# Patient Record
Sex: MALE | Race: WHITE | NOT HISPANIC OR LATINO | Employment: FULL TIME | URBAN - METROPOLITAN AREA
[De-identification: names, ages, dates, MRNs, and addresses within clinical notes are randomized per-mention and may not be internally consistent; named-entity substitution may affect disease eponyms.]

---

## 2022-10-03 ENCOUNTER — OFFICE VISIT (OUTPATIENT)
Dept: OBGYN CLINIC | Facility: CLINIC | Age: 47
End: 2022-10-03
Payer: COMMERCIAL

## 2022-10-03 VITALS
SYSTOLIC BLOOD PRESSURE: 122 MMHG | HEIGHT: 73 IN | BODY MASS INDEX: 39.76 KG/M2 | WEIGHT: 300 LBS | DIASTOLIC BLOOD PRESSURE: 84 MMHG

## 2022-10-03 DIAGNOSIS — M54.12 RADICULITIS OF RIGHT CERVICAL REGION: Primary | ICD-10-CM

## 2022-10-03 PROCEDURE — 99203 OFFICE O/P NEW LOW 30 MIN: CPT | Performed by: PHYSICIAN ASSISTANT

## 2022-10-03 RX ORDER — MELOXICAM 15 MG/1
15 TABLET ORAL DAILY
Qty: 30 TABLET | Refills: 0 | Status: SHIPPED | OUTPATIENT
Start: 2022-10-03

## 2022-10-03 RX ORDER — PREDNISONE 10 MG/1
TABLET ORAL
Qty: 42 TABLET | Refills: 0 | Status: SHIPPED | OUTPATIENT
Start: 2022-10-03

## 2022-10-03 RX ORDER — ERGOCALCIFEROL 1.25 MG/1
CAPSULE ORAL
COMMUNITY
Start: 2022-08-20

## 2022-10-03 RX ORDER — LEVOTHYROXINE SODIUM 0.07 MG/1
75 TABLET ORAL DAILY
COMMUNITY
Start: 2022-09-13

## 2022-10-03 NOTE — PROGRESS NOTES
Patient Name:  Sigifredo Garnica  MRN:  [id number]    Assessment & Plan     Right shoulder and cervical spine pain  Likely cervical radiculitis  1  Prescription for prednisone taper  2  Prescription for meloxicam to be initiated after prednisone taper is completed  3  Referral to physical therapy  4  Work note provided  5  Follow-up in six weeks with primary care sports medicine  Chief Complaint     Right shoulder pain, cervical spine pain    History of the Present Illness     Sigifredo Garnica is a 49 y o  male who reports to the office today for evaluation of his right shoulder and cervical spine  He notes an onset of pain approximately one week ago  He denies any injury or trauma  Pain is localized to the right shoulder and right side of the cervical spine  He notes pain radiating distally along the right upper extremity to the elbow and forearm region  He denies any numbness and tingling in the arm  He does note subjective weakness  No instability  He notes associated spasm and tightness in the cervical spine  Pain is worse with increased activity as well as cervical spine range of motion  He denies any gait or balance issues  No bowel or bladder dysfunction  No saddle paresthesias  He has tried over-the-counter anti-inflammatories with limited improvement  Physical Exam     /84   Ht 6' 0 5" (1 842 m)   Wt 136 kg (300 lb)   BMI 40 13 kg/m²     Cervical spine:  No gross deformity  No tenderness to palpation midline  There is tenderness to palpation right paraspinal musculature and right trapezius  Cervical spine range of motion is intact and limited with extension and lateral rotation with discomfort noted  Motor/sensation intact C5-T1 bilaterally with 5/5 strength  Positive Spurling's  Negative Rand's test bilaterally  Right shoulder:  No gross deformity  No tenderness to palpation lateral shoulder, AC joint, and anterior shoulder   PROM is , ER-abd 90, IR-abd 50  Impingement signs are negative  Negative empty can test   Negative speed's test   Negative cross-body adduction test   5/5 forward flexion strength  Eyes: Anicteric sclerae  ENT: Trachea midline  Lungs: Normal respiratory effort  CV: Capillary refill is less than 2 seconds  Skin: Intact without erythema  Lymph: No palpable lymphadenopathy  Neuro: Sensation is grossly intact to light touch  Psych: Mood and affect are appropriate  Data Review     I have personally reviewed pertinent films in PACS, and my interpretation follows:    X-rays right shoulder 10/3/22:  No acute osseous abnormalities  No fracture or dislocation  No significant degenerative changes  X-rays cervical spine 10/3/22:  No acute osseous abnormalities  No fracture or evidence of instability  No significant degenerative changes  History reviewed  No pertinent past medical history  History reviewed  No pertinent surgical history  No Known Allergies    Current Outpatient Medications on File Prior to Visit   Medication Sig Dispense Refill    ergocalciferol (VITAMIN D2) 50,000 units 1 CAP ONCE A WEEK      levothyroxine 75 mcg tablet Take 75 mcg by mouth daily       No current facility-administered medications on file prior to visit  Social History     Tobacco Use    Smoking status: Never Smoker    Smokeless tobacco: Never Used   Vaping Use    Vaping Use: Never used   Substance Use Topics    Alcohol use: Never    Drug use: Never       History reviewed  No pertinent family history  Review of Systems     As stated in the HPI  All other systems reviewed and are negative

## 2022-10-03 NOTE — LETTER
October 3, 2022     Patient: Shabnam Chow  YOB: 1975  Date of Visit: 10/3/2022      To Whom it May Concern:    Shabnam Chow is under my professional care  Melany Haynes was seen in my office on 10/3/2022  He is out of work until 10/7/22  At that point he may return full duty without restrictions  Patient was transported by his wife to his appointment today  If you have any questions or concerns, please don't hesitate to call           Sincerely,          Caridad Winn PA-C

## 2022-10-10 ENCOUNTER — EVALUATION (OUTPATIENT)
Dept: PHYSICAL THERAPY | Facility: REHABILITATION | Age: 47
End: 2022-10-10
Payer: COMMERCIAL

## 2022-10-10 DIAGNOSIS — M54.12 RADICULITIS OF RIGHT CERVICAL REGION: Primary | ICD-10-CM

## 2022-10-10 PROCEDURE — 97140 MANUAL THERAPY 1/> REGIONS: CPT | Performed by: PHYSICAL THERAPIST

## 2022-10-10 PROCEDURE — 97110 THERAPEUTIC EXERCISES: CPT | Performed by: PHYSICAL THERAPIST

## 2022-10-10 PROCEDURE — 97161 PT EVAL LOW COMPLEX 20 MIN: CPT | Performed by: PHYSICAL THERAPIST

## 2022-10-10 NOTE — PROGRESS NOTES
PT Evaluation     Today's date: 10/10/2022  Patient name: Jamilah Kumar  : 1975  MRN: [id number]  Referring provider: Marva Richards*  Dx:   Encounter Diagnosis     ICD-10-CM    1  Radiculitis of right cervical region  M54 12 Ambulatory Referral to Physical Therapy                  Assessment/Plan     Jamilah Kumar is a 49 y o  male who was referred to physical therapy for management of R sided cervical/shoulder pain secondary to probable C7 radiculopathy  Primary impairments include asymmetrical cervical ROM, decreased R UE strength, and report of pain  Consequently, patient has difficulty completing ADLs including lifting and prolonged sitting  Cleophus Daring would benefit from skilled intervention to address all deficits and improve functional capability  Patient is a good candidate for therapy, pending compliance with HEP and 2x weekly participation  Thank you for the referral and please do not hesitate to contact me with any questions or concerns regarding Ian’s care! Plan  Frequency: 2x per week   Duration in visits: 12  Duration in weeks: 6  POC start date: 10/10/22  POC end date: 22   Therapeutic exercise/activity, neuromuscular reeducation, manual therapy, and modalities  Patient understands and agrees to plan of care  Goals  Short Term--4 weeks  1  2 point decrease in pain levels  2  1/2 point increase in elbow extension/wrist flexion  3  Symmetrical cervical ROM  4  Negative R Spurlings    Long Term--By Discharge  1  Patient will achieve expected FOTO score  2  Patient will lift without restriction at work  3  Patient will sit without limitation  Patient's Goal: Decrease pain  Subjective     History   Date of Onset: Approx 3 weeks ago Description: Insidious onset of shoulder discomfort that worsened over a week into severe, unrelenting neck/arm pain  Patient reports that his neck was stuck in flexed position and he was unable to move his arm   He consulted with ortho, where he was provided a steroid taper, which he his [facility]FP through, and then will transition to prescription NSAID  He reports significant improvement with this treatment  Symptoms  Intermittent "throbbing" shoulder pain that radiates to the elbow  Symptoms aggravated with sitting and lifting  Pain is worse in the morning  Pain at best: 0 (at rest)   Pain at worst: [address] lives with his wife in a multistory home  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 SB Left 100   SB Right 80 P! ROT Left 100   ROT Right 80 P! Protraction: Full ROM with no aggravation of pain  Retraction: Full ROM with inc shoulder pain    Shoulder AROM: Full ROM except for horizontal adduction (only able to reach mid clavicle)              MMT    Shoulder       R       L   Flex  5 5   Extn  5 5   Abd  5 5   IR  4+ 5   ER  4 5                MMT    Elbow       R       L   Flex  5 5   Extn  4 5              MMT    Wrist       R         L   Flex  4 5   Extn  5 5    bent WNL WNL       Head positioning: forward head, rounded shoulders  Sensation (pinprick L/R):   intact   ULTT’s:  intact     Vertebral artery test=  NEG sharp carmen test=   NEG supine transverse ligament=  NEG Alar ligament= NEG    Compression=  NEG  Spurling’s= POS R    Cervical joint mobility: Hypomobile and inc shoulder pain with R->L lateral glides lower cervical spine; full resolution of sx with L->R glides    Thoracic mobility: Inc hypomobility mid/upper thoracic         Precautions: n/a         Manuals 10/10            SOR w/ distraction JAB            L->R lower cervical glides for opening Gr 4 JAB            Prone thoracic mobs Gr 4-5 JAB            Prone CTJ gr 5 JAB            Supine inf AC/SCJ mobs JAB            Neuro Re-Ed             UBE retro jeffery [employer]ENN             bilat TB ER                                                    Ther Ex             UT stretch to L for opening 10x10"             Standing pec stretch 10x10"                                                                                          Ther Activity                                       Gait Training                                       Modalities             Traction?

## 2022-10-10 NOTE — PROGRESS NOTES
PT Evaluation     Today's date: 10/10/2022  Patient name: Quique Lock  : 1975  MRN: [id number]  Referring provider: Suyapa Vaughan*  Dx:   Encounter Diagnosis     ICD-10-CM    1  Radiculitis of right cervical region  M54 12 Ambulatory Referral to Physical Therapy     PT plan of care cert/re-cert       Start Time: 730  Stop Time: 0815  Total time in clinic (min): 45 minutes    Assessment/Plan     Quique Lock is a 49 y o  male who was referred to physical therapy for management of R sided cervical/shoulder pain secondary to probable C7 radiculopathy  Primary impairments include asymmetrical cervical ROM, decreased R UE strength, and report of pain  Consequently, patient has difficulty completing ADLs including lifting and prolonged sitting  Jose J Vincent would benefit from skilled intervention to address all deficits and improve functional capability  Patient is a good candidate for therapy, pending compliance with HEP and 2x weekly participation  Thank you for the referral and please do not hesitate to contact me with any questions or concerns regarding Ian’s care! Plan  Frequency: 2x per week   Duration in visits: 12  Duration in weeks: 6  POC start date: 10/10/22  POC end date: 22   Therapeutic exercise/activity, neuromuscular reeducation, manual therapy, and modalities  Patient understands and agrees to plan of care  Goals  Short Term--4 weeks  1  2 point decrease in pain levels  2  1/2 point increase in elbow extension/wrist flexion  3  Symmetrical cervical ROM  4  Negative R Spurlings    Long Term--By Discharge  1  Patient will achieve expected FOTO score  2  Patient will lift without restriction at work  3  Patient will sit without limitation  Patient's Goal: Decrease pain  Subjective     History   Date of Onset: Approx 3 weeks ago Description: Insidious onset of shoulder discomfort that worsened over a week into severe, unrelenting neck/arm pain  Patient reports that his neck was stuck in flexed position and he was unable to move his arm  He consulted with ortho, where he was provided a steroid taper, which he his USP through, and then will transition to prescription NSAID  He reports significant improvement with this treatment  Symptoms  Intermittent "throbbing" shoulder pain that radiates to the elbow  Symptoms aggravated with sitting and lifting  Pain is worse in the morning  Pain at best: 0 (at rest)   Pain at worst: [address] lives with his wife in a multistory home  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 SB Left 100   SB Right 80 P! ROT Left 100   ROT Right 80 P! Protraction: Full ROM with no aggravation of pain  Retraction: Full ROM with inc shoulder pain    Shoulder AROM: Full ROM except for horizontal adduction (only able to reach mid clavicle)              MMT    Shoulder       R       L   Flex  5 5   Extn  5 5   Abd  5 5   IR  4+ 5   ER  4 5                MMT    Elbow       R       L   Flex  5 5   Extn  4 5              MMT    Wrist       R         L   Flex  4 5   Extn  5 5    bent WNL WNL       Head positioning: forward head, rounded shoulders  Sensation (pinprick L/R):   intact   ULTT’s:  intact     Vertebral artery test=  NEG sharp carmen test=   NEG supine transverse ligament=  NEG Alar ligament= NEG    Compression=  NEG  Spurling’s= POS R    Cervical joint mobility: Hypomobile and inc shoulder pain with R->L lateral glides lower cervical spine; full resolution of sx with L->R glides    Thoracic mobility: Inc hypomobility mid/upper thoracic         Precautions: n/a         Manuals 10/10            SOR w/ distraction JAB            L->R lower cervical glides for opening Gr 4 JAB            Prone thoracic mobs Gr 4-5 JAB            Prone CTJ gr 5 JAB            Supine inf AC/SCJ mobs JAB            Neuro Re-Ed             UBE retro             jeffery rows             jeffery LPD             bilat TB ER                                                    Ther Ex             UT stretch to L for opening 10x10"             Standing pec stretch 10x10"                                                                                          Ther Activity                                       Gait Training                                       Modalities             Traction?

## 2022-10-14 ENCOUNTER — OFFICE VISIT (OUTPATIENT)
Dept: PHYSICAL THERAPY | Facility: REHABILITATION | Age: 47
End: 2022-10-14
Payer: COMMERCIAL

## 2022-10-14 DIAGNOSIS — M54.12 RADICULITIS OF RIGHT CERVICAL REGION: Primary | ICD-10-CM

## 2022-10-14 PROCEDURE — 97140 MANUAL THERAPY 1/> REGIONS: CPT

## 2022-10-14 PROCEDURE — 97110 THERAPEUTIC EXERCISES: CPT

## 2022-10-14 NOTE — PROGRESS NOTES
Daily Note     Today's date: 10/14/2022  Patient name: Luís Zapata  : 1975  MRN: [id number]  Referring provider: Jazmine Balderrama*  Dx:   Encounter Diagnosis     ICD-10-CM    1  Radiculitis of right cervical region  M54 12                    Subjective: pt reports decreased intensity and frequency in R forearm which he describes as achiness  He further noted relief following IE and compliance with HEP  Objective: See treatment diary below      Assessment: Tolerated treatment well and without complaints  Good response with addition of manuals with reports of relief upon completion  Minimal soft tissue restrictions in UT and LS  Reviewed home exercises with patient with no adverse reactions  Patient demonstrated fatigue post treatment, exhibited good technique with therapeutic exercises and would benefit from continued PT  Plan: Continue per plan of care  Progress treatment as tolerated  Precautions: n/a         Manuals 10/10 10/14           SOR w/ distraction JAB TE           UT/LS STM and stretching  TE           L->R lower cervical glides for opening Gr 4 JAB            Prone thoracic mobs Gr 4-5 JAB            Prone CTJ gr 5 JAB            Supine inf AC/SCJ mobs JAB            Neuro Re-Ed             UBE retro             jeffeyr rows             jeffery LPD             bilat TB ER                                                    Ther Ex             UT stretch to L for opening 10x10"  10"x10           Standing pec stretch 10x10" 10"x10                                                                                         Ther Activity                                       Gait Training                                       Modalities             MHP pre  x10 min

## 2022-10-17 ENCOUNTER — OFFICE VISIT (OUTPATIENT)
Dept: PHYSICAL THERAPY | Facility: REHABILITATION | Age: 47
End: 2022-10-17
Payer: COMMERCIAL

## 2022-10-17 DIAGNOSIS — M54.12 RADICULITIS OF RIGHT CERVICAL REGION: Primary | ICD-10-CM

## 2022-10-17 PROCEDURE — 97110 THERAPEUTIC EXERCISES: CPT

## 2022-10-17 PROCEDURE — 97140 MANUAL THERAPY 1/> REGIONS: CPT

## 2022-10-17 NOTE — PROGRESS NOTES
Daily Note     Today's date: 10/17/2022  Patient name: Frederick Lopez  : 1975  MRN: [id number]  Referring provider: Donavan Gomes*  Dx:   Encounter Diagnosis     ICD-10-CM    1  Radiculitis of right cervical region  M54 12                    Subjective: pt reports that he is feeling really good today as well as over the weekend  Notes 0/10 pain upon presentation stating that he feels as if he is back to normal        Objective: See treatment diary below      Assessment: Tolerated treatment well and without complaints  Continues to demonstrate good carry over with exercises, feeling looser after manauls  Patient demonstrated fatigue post treatment, exhibited good technique with therapeutic exercises and would benefit from continued PT  Plan: Continue per plan of care  Progress treatment as tolerated  Precautions: n/a         Manuals 10/10 10/14 10/17          SOR w/ distraction JAB TE MM          UT/LS STM and stretching  TE MM          L->R lower cervical glides for opening Gr 4 JAB            Prone thoracic mobs Gr 4-5 JAB            Prone CTJ gr 5 JAB            Supine inf AC/SCJ mobs JAB            Neuro Re-Ed             UBE retro             jeffery rows             jeffery LPD             bilat TB ER                                                    Ther Ex             UT stretch to L for opening 10x10"  10"x10 10"x 10          Standing pec stretch 10x10" 10"x10 10"x 10                                                                                        Ther Activity                                       Gait Training                                       Modalities             MHP pre  x10 min x10 min

## 2022-10-19 ENCOUNTER — APPOINTMENT (OUTPATIENT)
Dept: PHYSICAL THERAPY | Facility: REHABILITATION | Age: 47
End: 2022-10-19

## 2022-10-24 ENCOUNTER — OFFICE VISIT (OUTPATIENT)
Dept: PHYSICAL THERAPY | Facility: REHABILITATION | Age: 47
End: 2022-10-24
Payer: COMMERCIAL

## 2022-10-24 DIAGNOSIS — M54.12 RADICULITIS OF RIGHT CERVICAL REGION: Primary | ICD-10-CM

## 2022-10-24 PROCEDURE — 97110 THERAPEUTIC EXERCISES: CPT

## 2022-10-24 NOTE — PROGRESS NOTES
Daily Note     Today's date: 10/24/2022  Patient name: Marcelene Holstein  : 1975  MRN: [id number]  Referring provider: Alice Moe*  Dx:   Encounter Diagnosis     ICD-10-CM    1  Radiculitis of right cervical region  M54 12                   Subjective: pt reports he no longer has been experiencing R UE radic sx's and neck pain  He wishes to make today the last day in therapy and continue with exercises at home  Objective: See treatment diary below      Assessment: Tolerated treatment well and without complaints  Good tolerance with progressions with no adverse reactions  Reviewed home exercises and issued updated HEP  Patient demonstrated fatigue post treatment and exhibited good technique with therapeutic exercises  Plan: Pt currently discharged from PT to be independent in HEP  Precautions: n/a         Manuals 10/10 10/14 10/17 10/24         SOR w/ distraction JAB TE MM          UT/LS STM and stretching  TE MM          L->R lower cervical glides for opening Gr 4 JAB            Prone thoracic mobs Gr 4-5 JAB            Prone CTJ gr 5 JAB            Supine inf AC/SCJ mobs JAB            Neuro Re-Ed             UBE retro    3' ea         jeffery rows    btb 5"2x10         jeffery LPD    btb 5"2x10         bilat TB ER    btb 5" 2x10                                                Ther Ex             UT stretch to L for opening 10x10"  10"x10 10"x 10 30"x3         Standing pec stretch 10x10" 10"x10 10"x 10 30"x3                                                                                       Ther Activity                                       Gait Training                                       Modalities             MHP pre  x10 min x10 min D/C

## 2022-10-26 ENCOUNTER — APPOINTMENT (OUTPATIENT)
Dept: PHYSICAL THERAPY | Facility: REHABILITATION | Age: 47
End: 2022-10-26

## 2022-10-31 ENCOUNTER — APPOINTMENT (OUTPATIENT)
Dept: PHYSICAL THERAPY | Facility: REHABILITATION | Age: 47
End: 2022-10-31

## 2022-11-11 ENCOUNTER — OFFICE VISIT (OUTPATIENT)
Dept: OBGYN CLINIC | Facility: CLINIC | Age: 47
End: 2022-11-11

## 2022-11-11 VITALS
HEIGHT: 73 IN | WEIGHT: 301.2 LBS | BODY MASS INDEX: 39.92 KG/M2 | DIASTOLIC BLOOD PRESSURE: 76 MMHG | HEART RATE: 82 BPM | SYSTOLIC BLOOD PRESSURE: 125 MMHG

## 2022-11-11 DIAGNOSIS — R29.898 RIGHT ARM WEAKNESS: ICD-10-CM

## 2022-11-11 DIAGNOSIS — M54.12 RADICULITIS OF RIGHT CERVICAL REGION: Primary | ICD-10-CM

## 2022-11-11 NOTE — PROGRESS NOTES
Assessment/Plan:    Diagnoses and all orders for this visit:    Radiculitis of right cervical region    Right arm weakness    Patient greatly improved after Prednisone, however he is left with intermittent n/t right elbow area, and has mild weakness C6-7 right arm on exam   May hold off on Mobic at this time  He has transitioned to HEP  If weakness persists t/c MRI C spine    Return in about 4 weeks (around 12/9/2022)  Chief Complaint:     Chief Complaint   Patient presents with   • Right Shoulder - Pain   • Neck - Pain       Subjective:   Patient ID: Catalino Schumacher is a 49 y o  male  RHD Patient returns s/p Prednisone and PT taking Mobic  He notes significant improvement of symptoms, denies significant pain but he is experiencing occasional numbness right elbow  Initial note with Ortho clinic PA: Catalino Schumacher is a 49 y o  male who reports to the office today for evaluation of his right shoulder and cervical spine  He notes an onset of pain approximately one week ago  He denies any injury or trauma  Pain is localized to the right shoulder and right side of the cervical spine  He notes pain radiating distally along the right upper extremity to the elbow and forearm region  He denies any numbness and tingling in the arm  He does note subjective weakness  No instability  He notes associated spasm and tightness in the cervical spine  Pain is worse with increased activity as well as cervical spine range of motion  He denies any gait or balance issues  No bowel or bladder dysfunction  No saddle paresthesias  He has tried over-the-counter anti-inflammatories with limited improvement  Review of Systems    The following portions of the patient's chart were reviewed and updated as appropriate: Allergy:  No Known Allergies    History reviewed  No pertinent past medical history  History reviewed  No pertinent surgical history      Social History     Socioeconomic History   • Marital status: /Civil Union     Spouse name: Not on file   • Number of children: Not on file   • Years of education: Not on file   • Highest education level: Not on file   Occupational History   • Not on file   Tobacco Use   • Smoking status: Never Smoker   • Smokeless tobacco: Never Used   Vaping Use   • Vaping Use: Never used   Substance and Sexual Activity   • Alcohol use: Never   • Drug use: Never   • Sexual activity: Not on file   Other Topics Concern   • Not on file   Social History Narrative   • Not on file     Social Determinants of Health     Financial Resource Strain: Not on file   Food Insecurity: Not on file   Transportation Needs: Not on file   Physical Activity: Not on file   Stress: Not on file   Social Connections: Not on file   Intimate Partner Violence: Not on file   Housing Stability: Not on file       History reviewed  No pertinent family history  Medications:    Current Outpatient Medications:   •  ergocalciferol (VITAMIN D2) 50,000 units, 1 CAP ONCE A WEEK, Disp: , Rfl:   •  levothyroxine 75 mcg tablet, Take 75 mcg by mouth daily, Disp: , Rfl:   •  meloxicam (Mobic) 15 mg tablet, Take 1 tablet (15 mg total) by mouth daily, Disp: 30 tablet, Rfl: 0  •  predniSONE 10 mg tablet, 6 tabs days 1&2, 5 tabs days 3&4, 4 tabs days 5&6, 3 tabs days 7&8, 2 tabs days 9&10, 1 tab days 11&12 (Patient not taking: Reported on 11/11/2022), Disp: 42 tablet, Rfl: 0    There is no problem list on file for this patient  Objective:  /76   Pulse 82   Ht 6' 0 5" (1 842 m)   Wt (!) 137 kg (301 lb 3 2 oz)   BMI 40 29 kg/m²     Ortho Exam    Physical Exam      Neurologic Exam     Motor Exam     Strength   Right deltoid: 5/5  Left deltoid: 5/5  Right biceps: 5/5  Right triceps: 4/5  Left triceps: 5/5  Right wrist extension: 4/5  Left wrist extension: 5/5      Procedures    I have personally reviewed the written report of the pertinent studies   Xrays C spine and Right Shoulder

## 2024-08-08 ENCOUNTER — OFFICE VISIT (OUTPATIENT)
Dept: FAMILY MEDICINE CLINIC | Facility: CLINIC | Age: 49
End: 2024-08-08
Payer: COMMERCIAL

## 2024-08-08 VITALS
TEMPERATURE: 97.4 F | WEIGHT: 315 LBS | HEART RATE: 72 BPM | BODY MASS INDEX: 41.75 KG/M2 | SYSTOLIC BLOOD PRESSURE: 146 MMHG | DIASTOLIC BLOOD PRESSURE: 94 MMHG | HEIGHT: 73 IN | OXYGEN SATURATION: 94 % | RESPIRATION RATE: 16 BRPM

## 2024-08-08 DIAGNOSIS — E03.9 HYPOTHYROIDISM, UNSPECIFIED TYPE: Primary | ICD-10-CM

## 2024-08-08 DIAGNOSIS — E78.5 HYPERLIPIDEMIA, UNSPECIFIED HYPERLIPIDEMIA TYPE: ICD-10-CM

## 2024-08-08 DIAGNOSIS — R79.89 ELEVATED LFTS: ICD-10-CM

## 2024-08-08 DIAGNOSIS — Z12.5 PROSTATE CANCER SCREENING: ICD-10-CM

## 2024-08-08 DIAGNOSIS — M25.552 LEFT HIP PAIN: ICD-10-CM

## 2024-08-08 DIAGNOSIS — N52.9 ERECTILE DYSFUNCTION, UNSPECIFIED ERECTILE DYSFUNCTION TYPE: ICD-10-CM

## 2024-08-08 DIAGNOSIS — Z12.11 COLON CANCER SCREENING: ICD-10-CM

## 2024-08-08 DIAGNOSIS — R03.0 ELEVATED BLOOD PRESSURE READING IN OFFICE WITHOUT DIAGNOSIS OF HYPERTENSION: ICD-10-CM

## 2024-08-08 DIAGNOSIS — Z00.00 HEALTHCARE MAINTENANCE: ICD-10-CM

## 2024-08-08 PROCEDURE — 99204 OFFICE O/P NEW MOD 45 MIN: CPT | Performed by: NURSE PRACTITIONER

## 2024-08-08 PROCEDURE — 99396 PREV VISIT EST AGE 40-64: CPT | Performed by: NURSE PRACTITIONER

## 2024-08-08 RX ORDER — SILDENAFIL 100 MG/1
100 TABLET, FILM COATED ORAL DAILY PRN
Qty: 30 TABLET | Refills: 1 | Status: SHIPPED | OUTPATIENT
Start: 2024-08-08

## 2024-08-08 NOTE — ASSESSMENT & PLAN NOTE
- Recommend NSAIDs, rest, and ice during episodes of pain.   - Referred to Orthopedic Surgery for further evaluation.

## 2024-08-08 NOTE — ASSESSMENT & PLAN NOTE
- Reviewed immunizations and screenings.   - UTD with dental and vision exams.   - Cologuard ordered.

## 2024-08-08 NOTE — ASSESSMENT & PLAN NOTE
- Not well controlled as of last lipid panel.   - Encourage healthy diet and regular exercise.  - Will obtain updated fasting lipid panel.

## 2024-08-08 NOTE — PROGRESS NOTES
Ambulatory Visit  Name: Ian Sharma      : 1975      MRN: [id number]  Encounter Provider: KAMALA Palma  Encounter Date: 2024   Encounter department:  [facility] [address] [facility]    Assessment & Plan   1. Hypothyroidism, unspecified type  Assessment & Plan:  - Continue levothyroxine 75 mcg daily.   - Will obtain updated TSH and free T4.  2. Hyperlipidemia, unspecified hyperlipidemia type  Assessment & Plan:  - Not well controlled as of last lipid panel.   - Encourage healthy diet and regular exercise.  - Will obtain updated fasting lipid panel.   3. Elevated blood pressure reading in office without diagnosis of hypertension  Assessment & Plan:  - Advised to monitor blood pressure at home and record readings. Bring to next visit.  - Recommend follow up in 1 month.   4. Elevated LFTs  Assessment & Plan:  - History of elevated LFTs. Abdominal US in  significant for hepatic steatosis.   - Will repeat CMP.   - Consider referral to GI/Hepatology.   5. Left hip pain  Assessment & Plan:  - Recommend NSAIDs, rest, and ice during episodes of pain.   - Referred to Orthopedic Surgery for further evaluation.   Orders:  -     Ambulatory Referral to Orthopedic Surgery; Future  6. Erectile dysfunction, unspecified erectile dysfunction type  Assessment & Plan:  - Refill sent for sildenafil.   Orders:  -     sildenafil (VIAGRA) 100 mg tablet; Take 1 tablet (100 mg total) by mouth daily as needed for erectile dysfunction  7. Healthcare maintenance  Assessment & Plan:  - Reviewed immunizations and screenings.   - UTD with dental and vision exams.   - Cologuard ordered.   Orders:  -     CBC and differential; Future  -     Comprehensive metabolic panel; Future  -     Lipid Panel with Direct LDL reflex; Future  -     TSH, 3rd generation with Free T4 reflex; Future  8. Prostate cancer screening  -     PSA, Total Screen; Future  9. Colon cancer screening  -     Cologuard       History of Present  Illness     Patient presents to office today to establish care. He has PMH of hypothyroidism. Also had elevated LFTs in the past. Was sent for abdominal US which showed hepatic steatosis and 2 small liver cysts. He is due for updated blood work. He has complaints today of intermittent sharp, pinching left hip pain that has been occurring on and off for the past 3 years. When the pain happens it causes him difficulty walking. The pain goes away on its own with rest. He is usually fine the next day. Doesn't take any medication for the pain. Denies any injury or trauma to the area. Also concerned with some varicose veins on his legs. They are not painful. He denies any pain or pallor to his lower extremities. His blood pressure is also elevated during today's visit. No prior history of hypertension. Unaware of any family history of hypertension. He denies any other concerns or complaints today.           Review of Systems   Constitutional:  Negative for fatigue and fever.   HENT:  Negative for congestion, rhinorrhea and trouble swallowing.    Eyes:  Negative for visual disturbance.   Respiratory:  Negative for cough and shortness of breath.    Cardiovascular:  Negative for chest pain and palpitations.   Gastrointestinal:  Negative for abdominal pain and blood in stool.   Endocrine: Negative for cold intolerance and heat intolerance.   Genitourinary:  Negative for difficulty urinating and dysuria.   Musculoskeletal:  Positive for arthralgias (left hip). Negative for gait problem.   Skin:  Negative for rash.        Varicose veins   Neurological:  Negative for dizziness, syncope and headaches.   Hematological:  Negative for adenopathy.   Psychiatric/Behavioral:  Negative for behavioral problems.      Past Medical History:   Diagnosis Date   • Disease of thyroid gland      Past Surgical History:   Procedure Laterality Date   • TONSILLECTOMY       Family History   Problem Relation Age of Onset   • Cancer Father      Social  "History     Tobacco Use   • Smoking status: Never   • Smokeless tobacco: Never   Vaping Use   • Vaping status: Never Used   Substance and Sexual Activity   • Alcohol use: Never   • Drug use: Never   • Sexual activity: Not on file     Current Outpatient Medications on File Prior to Visit   Medication Sig   • levothyroxine 75 mcg tablet Take 75 mcg by mouth daily   • ergocalciferol (VITAMIN D2) 50,000 units 1 CAP ONCE A WEEK (Patient not taking: Reported on 8/8/2024)   • meloxicam (Mobic) 15 mg tablet Take 1 tablet (15 mg total) by mouth daily (Patient not taking: Reported on 8/8/2024)   • predniSONE 10 mg tablet 6 tabs days 1&2, 5 tabs days 3&4, 4 tabs days 5&6, 3 tabs days 7&8, 2 tabs days 9&10, 1 tab days 11&12 (Patient not taking: Reported on 11/11/2022)     No Known Allergies  Immunization History   Administered Date(s) Administered   • COVID-19 PFIZER VACCINE 0.3 ML IM 09/24/2021, 10/15/2021     Objective     /94 (BP Location: Left arm, Patient Position: Sitting, Cuff Size: Large)   Pulse 72   Temp (!) 97.4 °F (36.3 °C) (Tympanic)   Resp 16   Ht 6' 1\" (1.854 m)   Wt (!) 156 kg (343 lb)   SpO2 94%   BMI 45.25 kg/m²     Physical Exam  Vitals and nursing note reviewed.   Constitutional:       General: He is not in acute distress.     Appearance: Normal appearance. He is not ill-appearing.   HENT:      Head: Normocephalic and atraumatic.      Right Ear: Tympanic membrane, ear canal and external ear normal.      Left Ear: Tympanic membrane, ear canal and external ear normal.      Nose: Nose normal.      Mouth/Throat:      Mouth: Mucous membranes are moist.      Pharynx: No posterior oropharyngeal erythema.   Eyes:      Conjunctiva/sclera: Conjunctivae normal.      Pupils: Pupils are equal, round, and reactive to light.   Cardiovascular:      Rate and Rhythm: Normal rate and regular rhythm.      Pulses: No decreased pulses.           Dorsalis pedis pulses are 2+ on the right side and 2+ on the left side. "      Heart sounds: Normal heart sounds.      Comments: Varicose veins to b/l lower extremities   Pulmonary:      Effort: Pulmonary effort is normal.      Breath sounds: Normal breath sounds.   Abdominal:      General: Bowel sounds are normal.      Palpations: Abdomen is soft.   Musculoskeletal:         General: Normal range of motion.      Cervical back: Normal range of motion.      Right lower leg: No edema.      Left lower leg: No edema.   Skin:     General: Skin is warm and dry.   Neurological:      Mental Status: He is alert and oriented to person, place, and time.   Psychiatric:         Mood and Affect: Mood normal.         Behavior: Behavior normal.

## 2024-08-08 NOTE — ASSESSMENT & PLAN NOTE
- History of elevated LFTs. Abdominal US in 2023 significant for hepatic steatosis.   - Will repeat CMP.   - Consider referral to GI/Hepatology.

## 2024-08-08 NOTE — ASSESSMENT & PLAN NOTE
- Advised to monitor blood pressure at home and record readings. Bring to next visit.  - Recommend follow up in 1 month.

## 2024-08-10 ENCOUNTER — APPOINTMENT (OUTPATIENT)
Dept: LAB | Age: 49
End: 2024-08-10
Payer: COMMERCIAL

## 2024-08-10 DIAGNOSIS — Z12.5 PROSTATE CANCER SCREENING: ICD-10-CM

## 2024-08-10 DIAGNOSIS — Z00.00 HEALTHCARE MAINTENANCE: ICD-10-CM

## 2024-08-10 LAB
ALBUMIN SERPL BCG-MCNC: 3.9 G/DL (ref 3.5–5)
ALP SERPL-CCNC: 39 U/L (ref 34–104)
ALT SERPL W P-5'-P-CCNC: 119 U/L (ref 7–52)
ANION GAP SERPL CALCULATED.3IONS-SCNC: 10 MMOL/L (ref 4–13)
AST SERPL W P-5'-P-CCNC: 51 U/L (ref 13–39)
BASOPHILS # BLD AUTO: 0.03 THOUSANDS/ÂΜL (ref 0–0.1)
BASOPHILS NFR BLD AUTO: 1 % (ref 0–1)
BILIRUB SERPL-MCNC: 0.64 MG/DL (ref 0.2–1)
BUN SERPL-MCNC: 19 MG/DL (ref 5–25)
CALCIUM SERPL-MCNC: 10.4 MG/DL (ref 8.4–10.2)
CHLORIDE SERPL-SCNC: 104 MMOL/L (ref 96–108)
CHOLEST SERPL-MCNC: 155 MG/DL
CO2 SERPL-SCNC: 24 MMOL/L (ref 21–32)
CREAT SERPL-MCNC: 0.98 MG/DL (ref 0.6–1.3)
EOSINOPHIL # BLD AUTO: 0.27 THOUSAND/ÂΜL (ref 0–0.61)
EOSINOPHIL NFR BLD AUTO: 5 % (ref 0–6)
ERYTHROCYTE [DISTWIDTH] IN BLOOD BY AUTOMATED COUNT: 13.1 % (ref 11.6–15.1)
GFR SERPL CREATININE-BSD FRML MDRD: 90 ML/MIN/1.73SQ M
GLUCOSE P FAST SERPL-MCNC: 91 MG/DL (ref 65–99)
HCT VFR BLD AUTO: 42.3 % (ref 36.5–49.3)
HDLC SERPL-MCNC: 22 MG/DL
HGB BLD-MCNC: 14 G/DL (ref 12–17)
IMM GRANULOCYTES # BLD AUTO: 0.02 THOUSAND/UL (ref 0–0.2)
IMM GRANULOCYTES NFR BLD AUTO: 0 % (ref 0–2)
LDLC SERPL CALC-MCNC: 111 MG/DL (ref 0–100)
LYMPHOCYTES # BLD AUTO: 2.03 THOUSANDS/ÂΜL (ref 0.6–4.47)
LYMPHOCYTES NFR BLD AUTO: 38 % (ref 14–44)
MCH RBC QN AUTO: 31 PG (ref 26.8–34.3)
MCHC RBC AUTO-ENTMCNC: 33.1 G/DL (ref 31.4–37.4)
MCV RBC AUTO: 94 FL (ref 82–98)
MONOCYTES # BLD AUTO: 0.4 THOUSAND/ÂΜL (ref 0.17–1.22)
MONOCYTES NFR BLD AUTO: 7 % (ref 4–12)
NEUTROPHILS # BLD AUTO: 2.67 THOUSANDS/ÂΜL (ref 1.85–7.62)
NEUTS SEG NFR BLD AUTO: 49 % (ref 43–75)
NRBC BLD AUTO-RTO: 0 /100 WBCS
PLATELET # BLD AUTO: 163 THOUSANDS/UL (ref 149–390)
PMV BLD AUTO: 10.7 FL (ref 8.9–12.7)
POTASSIUM SERPL-SCNC: 4 MMOL/L (ref 3.5–5.3)
PROT SERPL-MCNC: 8.2 G/DL (ref 6.4–8.4)
PSA SERPL-MCNC: 0.62 NG/ML (ref 0–4)
RBC # BLD AUTO: 4.51 MILLION/UL (ref 3.88–5.62)
SODIUM SERPL-SCNC: 138 MMOL/L (ref 135–147)
TRIGL SERPL-MCNC: 111 MG/DL
TSH SERPL DL<=0.05 MIU/L-ACNC: 2.43 UIU/ML (ref 0.45–4.5)
WBC # BLD AUTO: 5.42 THOUSAND/UL (ref 4.31–10.16)

## 2024-08-10 PROCEDURE — 36415 COLL VENOUS BLD VENIPUNCTURE: CPT

## 2024-08-10 PROCEDURE — 80061 LIPID PANEL: CPT

## 2024-08-10 PROCEDURE — 80053 COMPREHEN METABOLIC PANEL: CPT

## 2024-08-10 PROCEDURE — 84443 ASSAY THYROID STIM HORMONE: CPT

## 2024-08-10 PROCEDURE — G0103 PSA SCREENING: HCPCS

## 2024-08-10 PROCEDURE — 85025 COMPLETE CBC W/AUTO DIFF WBC: CPT

## 2024-08-14 ENCOUNTER — TELEPHONE (OUTPATIENT)
Age: 49
End: 2024-08-14

## 2024-08-15 DIAGNOSIS — E03.9 HYPOTHYROIDISM, UNSPECIFIED TYPE: Primary | ICD-10-CM

## 2024-08-15 DIAGNOSIS — R79.89 ELEVATED LFTS: ICD-10-CM

## 2024-08-15 RX ORDER — LEVOTHYROXINE SODIUM 75 UG/1
75 TABLET ORAL DAILY
Qty: 90 TABLET | Refills: 1 | Status: SHIPPED | OUTPATIENT
Start: 2024-08-15

## 2024-08-15 NOTE — TELEPHONE ENCOUNTER
PA for VIAGRA SUBMITTED     via    []CMM-KEY:   [x]Yovanarijosephine-Case ID #   []Faxed to plan   []Other website   []Phone call Case ID #     Office notes sent, clinical questions answered. Awaiting determination    Turnaround time for your insurance to make a decision on your Prior Authorization can take 7-21 business days.

## 2024-08-16 NOTE — TELEPHONE ENCOUNTER
I sent msg to patient to make him aware and he may contact insurance for any alternatives or use [facility] as they once had a generic at lost cost.

## 2024-08-16 NOTE — TELEPHONE ENCOUNTER
PA for sildenafil (VIAGRA) 100 mg tablet Denied    Reason:(Screenshot if applicable)        Message sent to office clinical pool Yes    Denial letter scanned into Media Yes    Appeal started No ( Provider will need to decide if appeal is warranted and send clinical documentation to Prior Authorization Team for initiation.)    **Please follow up with your patient regarding denial and next steps**

## 2024-08-27 ENCOUNTER — OFFICE VISIT (OUTPATIENT)
Dept: OBGYN CLINIC | Facility: CLINIC | Age: 49
End: 2024-08-27
Payer: COMMERCIAL

## 2024-08-27 VITALS
SYSTOLIC BLOOD PRESSURE: 132 MMHG | HEIGHT: 73 IN | BODY MASS INDEX: 41.75 KG/M2 | WEIGHT: 315 LBS | DIASTOLIC BLOOD PRESSURE: 80 MMHG

## 2024-08-27 DIAGNOSIS — M16.12 PRIMARY OSTEOARTHRITIS OF ONE HIP, LEFT: Primary | ICD-10-CM

## 2024-08-27 PROCEDURE — 99214 OFFICE O/P EST MOD 30 MIN: CPT | Performed by: PHYSICIAN ASSISTANT

## 2024-08-27 RX ORDER — MELOXICAM 15 MG/1
15 TABLET ORAL DAILY
Qty: 30 TABLET | Refills: 0 | Status: SHIPPED | OUTPATIENT
Start: 2024-08-27

## 2024-08-29 LAB — COLOGUARD RESULT REPORTABLE: NORMAL

## 2024-09-07 PROBLEM — Z12.5 PROSTATE CANCER SCREENING: Status: RESOLVED | Noted: 2024-08-08 | Resolved: 2024-09-07

## 2024-09-07 PROBLEM — Z00.00 HEALTHCARE MAINTENANCE: Status: RESOLVED | Noted: 2024-08-08 | Resolved: 2024-09-07

## 2024-09-07 PROBLEM — Z12.11 COLON CANCER SCREENING: Status: RESOLVED | Noted: 2024-08-08 | Resolved: 2024-09-07

## 2024-09-24 DIAGNOSIS — M16.12 PRIMARY OSTEOARTHRITIS OF ONE HIP, LEFT: ICD-10-CM

## 2024-09-24 RX ORDER — MELOXICAM 15 MG/1
15 TABLET ORAL DAILY
Qty: 30 TABLET | Refills: 5 | Status: SHIPPED | OUTPATIENT
Start: 2024-09-24

## 2024-10-07 ENCOUNTER — OFFICE VISIT (OUTPATIENT)
Dept: GASTROENTEROLOGY | Facility: CLINIC | Age: 49
End: 2024-10-07
Payer: COMMERCIAL

## 2024-10-07 VITALS
WEIGHT: 315 LBS | BODY MASS INDEX: 41.75 KG/M2 | SYSTOLIC BLOOD PRESSURE: 123 MMHG | OXYGEN SATURATION: 95 % | DIASTOLIC BLOOD PRESSURE: 79 MMHG | HEART RATE: 78 BPM | TEMPERATURE: 96.9 F | HEIGHT: 73 IN

## 2024-10-07 DIAGNOSIS — K76.0 METABOLIC DYSFUNCTION-ASSOCIATED STEATOTIC LIVER DISEASE (MASLD): Primary | ICD-10-CM

## 2024-10-07 DIAGNOSIS — R79.89 ELEVATED LFTS: ICD-10-CM

## 2024-10-07 DIAGNOSIS — E66.813 CLASS 3 SEVERE OBESITY DUE TO EXCESS CALORIES WITHOUT SERIOUS COMORBIDITY WITH BODY MASS INDEX (BMI) OF 40.0 TO 44.9 IN ADULT (HCC): ICD-10-CM

## 2024-10-07 DIAGNOSIS — E66.01 CLASS 3 SEVERE OBESITY DUE TO EXCESS CALORIES WITHOUT SERIOUS COMORBIDITY WITH BODY MASS INDEX (BMI) OF 40.0 TO 44.9 IN ADULT (HCC): ICD-10-CM

## 2024-10-07 PROCEDURE — 99244 OFF/OP CNSLTJ NEW/EST MOD 40: CPT | Performed by: INTERNAL MEDICINE

## 2024-10-07 NOTE — PROGRESS NOTES
[facility] - Outpatient Consultation  Ian Sharma 49 y.o. male MRN: [id number]  Encounter: [id number]    PCP:  KAMALA Palma, [phone]  Referring Provider:  Effie Medina CRNP, [phone]        ASSESSMENT AND PLAN:      Elevated LFTs and fatty appearing liver on imaging:    Most likely related to metabolic dysfunction associated steatotic liver disease.    I discussed with Mr. Sharma the natural history of fatty liver disease, including risks for development, and risk for progression to cirrhosis and its complications.      I explained that even if liver enzymes are normal, it does not rule out low levels of active fatty inflammation in the liver. Mr. Sharma will have additional blood work done to rule out other causes of chronic liver disease/elevated liver enzymes, including  viral hepatitis, hepatitis A and B immunity testing, autoimmune hepatitis, hemochromatosis, Guido's disease, and Alpha-1-antitrypsin deficiency.  I will also check vitamin D levels and hemoglobin A1c.    NAFLD Fibrosis Score is a non-invasive calculation based on patient characteristics and basic routine labs (Age, BMI, AST, ALT, Platelet count, Albumin and presence/absence of insulin resistance).  Mr. Sharma's NAFLD Fibrosis score is 0.16.  Scores between -1.455 and 0.675 are considered indeterminant and unfortunately are not very helpful for predicting fibrosis stage in NAFLD.    I have requested lab based enhanced liver fibrosis score [ELF] and abdominal ultrasound elastography with fat quantification (UGAP) to further assess hepatic fibrosis and steatosis in a non-invasive manner.    I counseled Mr. Sharma on the importance of weight loss through lifestyle modification, primary diet and exercise, and the benefits of seeking input of a dietician/nutritionist as well as consultation with a medical weight loss specialist or bariatric surgery team.  He had seen a medical weight  loss team at [facility] a few years ago and feels he is on his weight and weight loss at this time and would like to try to lose weight on his own for now.  I did spend considerable time discussing with him methods for weight loss including determining his daily caloric needs and physical activity.    Patient has not had a colonoscopy.  He did have a Cologuard that was an unsatisfactory study and I reminded him to send in his stool sample at his earliest convenience.    Mr. Sharma will have the testing done as outlined above and follow-up in 6 months.        FOLLOW-UP:  Return in about 6 months (around 4/7/2025).    VISIT DIAGNOSES AND ORDERS:      1. Metabolic dysfunction-associated steatotic liver disease (MASLD)    2. Elevated LFTs    3. Class 3 severe obesity due to excess calories without serious comorbidity with body mass index (BMI) of 40.0 to 44.9 in adult (HCC)      Orders Placed This Encounter   Procedures    US abdomen complete    US elastography/UGAP    Ceruloplasmin    IgG, IgA, IgM    Antimitochondrial antibody    Anti-smooth muscle antibody, IgG    RADHA Screen w/ Reflex to Titer/Pattern    Hepatitis A antibody, total    Hepatitis B surface antibody    Hepatitis panel, acute    enhanced liver fibrosis (elf) score    Alpha 1 Antitrypsin Phenotype    Vitamin D 25 hydroxy    Hemoglobin A1C     I have spent a total time of 38 minutes in caring for this patient on the day of the visit/encounter including Diagnostic results, Prognosis, Risks and benefits of tx options, Instructions for management, Patient and family education, Importance of tx compliance, Risk factor reductions, Impressions, Counseling / Coordination of care, Documenting in the medical record, Reviewing / ordering tests, medicine, procedures  , Obtaining or reviewing history  , and Communicating with other healthcare professionals .    ______________________________________________________________________    HPI:  Mr. Sosa  Zachary is a 49 y.o. male with medical history as outlined below, including hypothyroidism, hyperlipidemia, obesity, hypertension, who comes in today for initial consultation for evaluation of elevated LFTs with imaging showing evidence of hepatomegaly and steatosis.    He denies any known personal or family history of chronic liver disease prior to this.  He did state he had an ultrasound a few years ago that showed a fatty appearing liver which persisted on an ultrasound last year.  Blood work showed he had elevated transaminases, with AST 51, .  This is down from levels in 2022 where ALT was 280 and AST was 107.    He denies a history of heavy alcohol use.    Mr. Sharma denies recent or history of yellow eyes/skin, dark urine, GI bleeding, abdominal distention with fluid, lower extremity swelling, easy bruising, excessive bleeding, pruritus or confusion.  He denies abdominal pain, nausea, vomiting, heartburn, reflux, difficulty swallowing, early satiety, bloating, diarrhea, constipation or straining with passing stools.      REVIEW OF SYSTEMS:    Review of Systems   Constitutional:  Negative for fatigue, fever and unexpected weight change.   HENT:  Negative for mouth sores, sore throat and trouble swallowing.    Eyes:  Negative for itching and visual disturbance.   Respiratory:  Negative for cough, chest tightness, shortness of breath and wheezing.    Cardiovascular:  Negative for chest pain, palpitations and leg swelling.   Endocrine: Negative for cold intolerance, heat intolerance and polyuria.   Genitourinary:  Negative for difficulty urinating, dysuria and hematuria.   Musculoskeletal:  Positive for arthralgias (L hip). Negative for back pain and gait problem.   Skin:  Negative for color change and rash.   Allergic/Immunologic: Negative for environmental allergies.   Neurological:  Negative for dizziness, weakness, light-headedness and numbness.   Hematological:  Does not bruise/bleed easily.  "  Psychiatric/Behavioral:  Negative for confusion and sleep disturbance. The patient is not nervous/anxious.          Historical Information   Patient Active Problem List   Diagnosis    Hypothyroidism    Elevated LFTs    Hyperlipidemia    Left hip pain    Erectile dysfunction    Elevated blood pressure reading in office without diagnosis of hypertension     Past Medical History:   Diagnosis Date    Disease of thyroid gland      Past Surgical History:   Procedure Laterality Date    TONSILLECTOMY       Social History   Social History     Substance and Sexual Activity   Alcohol Use Not Currently     Social History     Substance and Sexual Activity   Drug Use Never     Social History     Tobacco Use   Smoking Status Never   Smokeless Tobacco Never     Family History   Problem Relation Age of Onset    Cancer Father        Meds/Allergies       Current Outpatient Medications:     levothyroxine 75 mcg tablet    meloxicam (MOBIC) 15 mg tablet    sildenafil (VIAGRA) 100 mg tablet    meloxicam (Mobic) 15 mg tablet    No Known Allergies        Objective     Blood pressure 123/79, pulse 78, temperature (!) 96.9 °F (36.1 °C), temperature source Tympanic, height 6' 1\" (1.854 m), weight (!) 149 kg (328 lb 4.8 oz), SpO2 95%. Body mass index is 43.31 kg/m².        PHYSICAL EXAM:           Physical Exam  Vitals reviewed.   Constitutional:       General: He is not in acute distress.     Appearance: He is obese. He is not ill-appearing.   HENT:      Head: Normocephalic and atraumatic.      Nose: Nose normal.      Mouth/Throat:      Pharynx: Oropharynx is clear. No posterior oropharyngeal erythema.   Eyes:      General: No scleral icterus.     Extraocular Movements: Extraocular movements intact.   Cardiovascular:      Rate and Rhythm: Normal rate and regular rhythm.      Heart sounds: No murmur heard.  Pulmonary:      Effort: Pulmonary effort is normal. No respiratory distress.      Breath sounds: Normal breath sounds. No rales. " "  Abdominal:      General: There is no distension.      Palpations: Abdomen is soft. There is no shifting dullness, fluid wave, hepatomegaly or splenomegaly.      Tenderness: There is no abdominal tenderness. There is no guarding.   Musculoskeletal:         General: No swelling. Normal range of motion.      Cervical back: Normal range of motion and neck supple.   Skin:     General: Skin is warm.      Coloration: Skin is not jaundiced.   Neurological:      General: No focal deficit present.      Mental Status: He is oriented to person, place, and time.   Psychiatric:         Mood and Affect: Mood normal.              Lab Results:   Lab Results   Component Value Date    SODIUM 138 08/10/2024    K 4.0 08/10/2024    BUN 19 08/10/2024    CREATININE 0.98 08/10/2024    TBILI 0.64 08/10/2024    ALKPHOS 39 08/10/2024    ALB 3.9 08/10/2024    AST 51 (H) 08/10/2024     (H) 08/10/2024    WBC 5.42 08/10/2024     08/10/2024    HGB 14.0 08/10/2024     No results found for: \"AFP\"  Lab Results   Component Value Date    FERRITIN 23 01/12/2018     Lab Results   Component Value Date    HGBA1C 5.3 01/12/2018    TSH 2.00 04/09/2022     NAFLD Fibrosis Score: 0.16 at 8/10/2024 10:07 AM  Calculated from:  SGOT/AST: 51 U/L at 8/10/2024 10:07 AM  SGPT/ALT: 119 U/L at 8/10/2024 10:07 AM  Serum Albumin: 3.9 g/dL at 8/10/2024 10:07 AM  Platelets: 163 Thousands/uL at 8/10/2024 10:07 AM  Age: 48 years  BMI: 45.3 at 8/8/2024  7:40 AM  Weight (recorded): 155.58 kg at 8/8/2024  7:40 AM  Height: 185.40 cm at 8/8/2024  7:40 AM  Has Diabetes: No    Fibrosis-4 (FIB-4) Score is: 1.43    Indication for testing Absence of advanced fibrosis Presence of advanced fibrosis Indeterminate result   NAFLD <1.30 >2.67 1.30-2.67   Hepatitis C <1.45 >3.25 1.45-3.25   Hepatitis B <1.00 >2.65 1.00-2.65     FIB-4 Score Component Values:  Component Value Date   Age: 49 y.o.     AST: 51 U/L 8/10/2024   Platelet: 163 Thousands/uL 8/10/2024   ALT: 119 U/L " 8/10/2024           Radiology Results:   I have reviewed the results of any radiology studies performed within the last 90 days. This includes:      No results found.      Tan Schultz MD  Hepatology/Gastroenterology

## 2024-10-07 NOTE — PATIENT INSTRUCTIONS
Have the blood work done as requested.  Schedule ultrasound and elastography.  Check out [email or web address].net   Download an geraldine to help track your food intake.  I like myfitnesspal, but there others

## 2024-10-11 ENCOUNTER — TELEPHONE (OUTPATIENT)
Dept: FAMILY MEDICINE CLINIC | Facility: CLINIC | Age: 49
End: 2024-10-11

## 2024-10-11 LAB — COLOGUARD RESULT REPORTABLE: NEGATIVE

## 2025-02-11 ENCOUNTER — HOSPITAL ENCOUNTER (OUTPATIENT)
Dept: ULTRASOUND IMAGING | Facility: MEDICAL CENTER | Age: 50
Discharge: HOME | End: 2025-02-11
Payer: COMMERCIAL

## 2025-02-11 DIAGNOSIS — E66.01 CLASS 3 SEVERE OBESITY DUE TO EXCESS CALORIES WITHOUT SERIOUS COMORBIDITY WITH BODY MASS INDEX (BMI) OF 40.0 TO 44.9 IN ADULT (HCC): ICD-10-CM

## 2025-02-11 DIAGNOSIS — E66.813 CLASS 3 SEVERE OBESITY DUE TO EXCESS CALORIES WITHOUT SERIOUS COMORBIDITY WITH BODY MASS INDEX (BMI) OF 40.0 TO 44.9 IN ADULT (HCC): ICD-10-CM

## 2025-02-11 DIAGNOSIS — K76.0 METABOLIC DYSFUNCTION-ASSOCIATED STEATOTIC LIVER DISEASE (MASLD): ICD-10-CM

## 2025-02-11 DIAGNOSIS — R79.89 ELEVATED LFTS: ICD-10-CM

## 2025-02-11 PROCEDURE — 76981 USE PARENCHYMA: CPT

## 2025-02-11 PROCEDURE — 76700 US EXAM ABDOM COMPLETE: CPT

## 2025-02-14 ENCOUNTER — RESULTS FOLLOW-UP (OUTPATIENT)
Age: 50
End: 2025-02-14

## 2025-02-17 DIAGNOSIS — E03.9 HYPOTHYROIDISM, UNSPECIFIED TYPE: ICD-10-CM

## 2025-02-17 DIAGNOSIS — R79.89 ELEVATED LFTS: ICD-10-CM

## 2025-02-17 DIAGNOSIS — R03.0 ELEVATED BLOOD PRESSURE READING IN OFFICE WITHOUT DIAGNOSIS OF HYPERTENSION: ICD-10-CM

## 2025-02-17 DIAGNOSIS — E78.5 HYPERLIPIDEMIA, UNSPECIFIED HYPERLIPIDEMIA TYPE: Primary | ICD-10-CM

## 2025-02-17 NOTE — TELEPHONE ENCOUNTER
Pt last seen 8/8/24 and he was to follow up in 1 month. I called pt and he is scheduled to be seen on 2/26/25 and I did place a order for routine labs as pt requested.  I sent for a 30 day refill to provider for approval  
denies pain/discomfort (Rating = 0)

## 2025-02-18 RX ORDER — LEVOTHYROXINE SODIUM 75 UG/1
75 TABLET ORAL DAILY
Qty: 90 TABLET | Refills: 0 | Status: SHIPPED | OUTPATIENT
Start: 2025-02-18

## 2025-02-22 ENCOUNTER — APPOINTMENT (OUTPATIENT)
Dept: LAB | Age: 50
End: 2025-02-22
Payer: COMMERCIAL

## 2025-02-22 DIAGNOSIS — R03.0 ELEVATED BLOOD PRESSURE READING IN OFFICE WITHOUT DIAGNOSIS OF HYPERTENSION: ICD-10-CM

## 2025-02-22 DIAGNOSIS — K76.0 METABOLIC DYSFUNCTION-ASSOCIATED STEATOTIC LIVER DISEASE (MASLD): ICD-10-CM

## 2025-02-22 DIAGNOSIS — E03.9 HYPOTHYROIDISM, UNSPECIFIED TYPE: ICD-10-CM

## 2025-02-22 DIAGNOSIS — E66.813 CLASS 3 SEVERE OBESITY DUE TO EXCESS CALORIES WITHOUT SERIOUS COMORBIDITY WITH BODY MASS INDEX (BMI) OF 40.0 TO 44.9 IN ADULT (HCC): ICD-10-CM

## 2025-02-22 DIAGNOSIS — E66.01 CLASS 3 SEVERE OBESITY DUE TO EXCESS CALORIES WITHOUT SERIOUS COMORBIDITY WITH BODY MASS INDEX (BMI) OF 40.0 TO 44.9 IN ADULT (HCC): ICD-10-CM

## 2025-02-22 DIAGNOSIS — E78.5 HYPERLIPIDEMIA, UNSPECIFIED HYPERLIPIDEMIA TYPE: ICD-10-CM

## 2025-02-22 DIAGNOSIS — R79.89 ELEVATED LFTS: ICD-10-CM

## 2025-02-22 LAB
25(OH)D3 SERPL-MCNC: 11.4 NG/ML (ref 30–100)
ALBUMIN SERPL BCG-MCNC: 4.1 G/DL (ref 3.5–5)
ALP SERPL-CCNC: 47 U/L (ref 34–104)
ALT SERPL W P-5'-P-CCNC: 50 U/L (ref 7–52)
ANION GAP SERPL CALCULATED.3IONS-SCNC: 8 MMOL/L (ref 4–13)
AST SERPL W P-5'-P-CCNC: 27 U/L (ref 13–39)
BASOPHILS # BLD AUTO: 0.03 THOUSANDS/ΜL (ref 0–0.1)
BASOPHILS NFR BLD AUTO: 1 % (ref 0–1)
BILIRUB SERPL-MCNC: 0.79 MG/DL (ref 0.2–1)
BUN SERPL-MCNC: 21 MG/DL (ref 5–25)
CALCIUM SERPL-MCNC: 10.5 MG/DL (ref 8.4–10.2)
CHLORIDE SERPL-SCNC: 101 MMOL/L (ref 96–108)
CHOLEST SERPL-MCNC: 176 MG/DL (ref ?–200)
CO2 SERPL-SCNC: 30 MMOL/L (ref 21–32)
CREAT SERPL-MCNC: 1.01 MG/DL (ref 0.6–1.3)
EOSINOPHIL # BLD AUTO: 0.28 THOUSAND/ΜL (ref 0–0.61)
EOSINOPHIL NFR BLD AUTO: 6 % (ref 0–6)
ERYTHROCYTE [DISTWIDTH] IN BLOOD BY AUTOMATED COUNT: 12.9 % (ref 11.6–15.1)
EST. AVERAGE GLUCOSE BLD GHB EST-MCNC: 111 MG/DL
GFR SERPL CREATININE-BSD FRML MDRD: 86 ML/MIN/1.73SQ M
GLUCOSE P FAST SERPL-MCNC: 96 MG/DL (ref 65–99)
HAV AB SER QL IA: NORMAL
HAV IGM SER QL: NORMAL
HBA1C MFR BLD: 5.5 %
HBV CORE IGM SER QL: NORMAL
HBV SURFACE AB SER-ACNC: <3 MIU/ML
HBV SURFACE AG SER QL: NORMAL
HCT VFR BLD AUTO: 46 % (ref 36.5–49.3)
HCV AB SER QL: NORMAL
HDLC SERPL-MCNC: 27 MG/DL
HGB BLD-MCNC: 15.2 G/DL (ref 12–17)
IGA SERPL-MCNC: 57 MG/DL (ref 66–433)
IGG SERPL-MCNC: 2231 MG/DL (ref 635–1741)
IGM SERPL-MCNC: <20 MG/DL (ref 45–281)
IMM GRANULOCYTES # BLD AUTO: 0.01 THOUSAND/UL (ref 0–0.2)
IMM GRANULOCYTES NFR BLD AUTO: 0 % (ref 0–2)
LDLC SERPL CALC-MCNC: 128 MG/DL (ref 0–100)
LYMPHOCYTES # BLD AUTO: 1.84 THOUSANDS/ΜL (ref 0.6–4.47)
LYMPHOCYTES NFR BLD AUTO: 39 % (ref 14–44)
MCH RBC QN AUTO: 31.6 PG (ref 26.8–34.3)
MCHC RBC AUTO-ENTMCNC: 33 G/DL (ref 31.4–37.4)
MCV RBC AUTO: 96 FL (ref 82–98)
MONOCYTES # BLD AUTO: 0.27 THOUSAND/ΜL (ref 0.17–1.22)
MONOCYTES NFR BLD AUTO: 6 % (ref 4–12)
NEUTROPHILS # BLD AUTO: 2.32 THOUSANDS/ΜL (ref 1.85–7.62)
NEUTS SEG NFR BLD AUTO: 48 % (ref 43–75)
NRBC BLD AUTO-RTO: 0 /100 WBCS
PLATELET # BLD AUTO: 152 THOUSANDS/UL (ref 149–390)
PMV BLD AUTO: 11.6 FL (ref 8.9–12.7)
POTASSIUM SERPL-SCNC: 4.2 MMOL/L (ref 3.5–5.3)
PROT SERPL-MCNC: 8.6 G/DL (ref 6.4–8.4)
RBC # BLD AUTO: 4.81 MILLION/UL (ref 3.88–5.62)
SODIUM SERPL-SCNC: 139 MMOL/L (ref 135–147)
TRIGL SERPL-MCNC: 105 MG/DL (ref ?–150)
TSH SERPL DL<=0.05 MIU/L-ACNC: 2.63 UIU/ML (ref 0.45–4.5)
WBC # BLD AUTO: 4.75 THOUSAND/UL (ref 4.31–10.16)

## 2025-02-22 PROCEDURE — 82103 ALPHA-1-ANTITRYPSIN TOTAL: CPT

## 2025-02-22 PROCEDURE — 85025 COMPLETE CBC W/AUTO DIFF WBC: CPT

## 2025-02-22 PROCEDURE — 86015 ACTIN ANTIBODY EACH: CPT

## 2025-02-22 PROCEDURE — 80074 ACUTE HEPATITIS PANEL: CPT

## 2025-02-22 PROCEDURE — 86038 ANTINUCLEAR ANTIBODIES: CPT

## 2025-02-22 PROCEDURE — 86381 MITOCHONDRIAL ANTIBODY EACH: CPT

## 2025-02-22 PROCEDURE — 86706 HEP B SURFACE ANTIBODY: CPT

## 2025-02-22 PROCEDURE — 36415 COLL VENOUS BLD VENIPUNCTURE: CPT

## 2025-02-22 PROCEDURE — 80053 COMPREHEN METABOLIC PANEL: CPT

## 2025-02-22 PROCEDURE — 84443 ASSAY THYROID STIM HORMONE: CPT

## 2025-02-22 PROCEDURE — 83036 HEMOGLOBIN GLYCOSYLATED A1C: CPT

## 2025-02-22 PROCEDURE — 80061 LIPID PANEL: CPT

## 2025-02-22 PROCEDURE — 82104 ALPHA-1-ANTITRYPSIN PHENO: CPT

## 2025-02-22 PROCEDURE — 82306 VITAMIN D 25 HYDROXY: CPT

## 2025-02-22 PROCEDURE — 86708 HEPATITIS A ANTIBODY: CPT

## 2025-02-22 PROCEDURE — 86225 DNA ANTIBODY NATIVE: CPT

## 2025-02-22 PROCEDURE — 82784 ASSAY IGA/IGD/IGG/IGM EACH: CPT

## 2025-02-24 LAB
ACTIN IGG SERPL-ACNC: 2 UNITS (ref 0–19)
DSDNA IGG SERPL IA-ACNC: <0.9 IU/ML (ref ?–15)
MITOCHONDRIA M2 IGG SER-ACNC: <20 UNITS (ref 0–20)
NUCLEAR IGG SER IA-RTO: <0.09 RATIO (ref ?–1)

## 2025-02-25 LAB — LIVER FIBR SCORE SERPL CALC.FIBROSURE: 11.17

## 2025-02-26 ENCOUNTER — OFFICE VISIT (OUTPATIENT)
Dept: FAMILY MEDICINE CLINIC | Facility: CLINIC | Age: 50
End: 2025-02-26
Payer: COMMERCIAL

## 2025-02-26 VITALS
SYSTOLIC BLOOD PRESSURE: 128 MMHG | HEART RATE: 67 BPM | DIASTOLIC BLOOD PRESSURE: 84 MMHG | RESPIRATION RATE: 16 BRPM | TEMPERATURE: 97.8 F | BODY MASS INDEX: 41.75 KG/M2 | WEIGHT: 315 LBS | OXYGEN SATURATION: 98 % | HEIGHT: 73 IN

## 2025-02-26 DIAGNOSIS — K76.0 METABOLIC DYSFUNCTION-ASSOCIATED STEATOTIC LIVER DISEASE (MASLD): ICD-10-CM

## 2025-02-26 DIAGNOSIS — E78.5 HYPERLIPIDEMIA, UNSPECIFIED HYPERLIPIDEMIA TYPE: ICD-10-CM

## 2025-02-26 DIAGNOSIS — E03.9 HYPOTHYROIDISM, UNSPECIFIED TYPE: Primary | ICD-10-CM

## 2025-02-26 DIAGNOSIS — M16.12 PRIMARY OSTEOARTHRITIS OF ONE HIP, LEFT: ICD-10-CM

## 2025-02-26 DIAGNOSIS — Z12.5 SCREENING FOR PROSTATE CANCER: ICD-10-CM

## 2025-02-26 DIAGNOSIS — E55.9 VITAMIN D DEFICIENCY: ICD-10-CM

## 2025-02-26 DIAGNOSIS — R03.0 ELEVATED BLOOD PRESSURE READING IN OFFICE WITHOUT DIAGNOSIS OF HYPERTENSION: ICD-10-CM

## 2025-02-26 DIAGNOSIS — E83.52 HYPERCALCEMIA: ICD-10-CM

## 2025-02-26 PROCEDURE — 99214 OFFICE O/P EST MOD 30 MIN: CPT | Performed by: NURSE PRACTITIONER

## 2025-02-26 RX ORDER — MELOXICAM 15 MG/1
15 TABLET ORAL DAILY
Qty: 90 TABLET | Refills: 0 | Status: SHIPPED | OUTPATIENT
Start: 2025-02-26

## 2025-02-26 RX ORDER — ERGOCALCIFEROL 1.25 MG/1
50000 CAPSULE, LIQUID FILLED ORAL WEEKLY
Qty: 12 CAPSULE | Refills: 0 | Status: SHIPPED | OUTPATIENT
Start: 2025-02-26 | End: 2025-05-15

## 2025-02-26 NOTE — ASSESSMENT & PLAN NOTE
- LFTs greatly improved.   - Encourage low fat diet and regular exercise.  - Continue routine follow up with GI.

## 2025-02-26 NOTE — ASSESSMENT & PLAN NOTE
- Prescription sent for Vitamin D 50,000 units weekly for 12 weeks. Can then take OTC supplement of 2000 units/day.  - Will continue to monitor Vitamin D level.   Orders:  •  ergocalciferol (VITAMIN D2) 50,000 units; Take 1 capsule (50,000 Units total) by mouth once a week for 12 doses  •  Vitamin D 25 hydroxy; Future

## 2025-02-26 NOTE — ASSESSMENT & PLAN NOTE
- Will check PTH.  - Consider referral to Endocrinology if elevated.  Orders:  •  PTH, intact; Future  •  Comprehensive metabolic panel; Future

## 2025-02-26 NOTE — ASSESSMENT & PLAN NOTE
- Blood pressure improved and now well controlled.  - Will continue to monitor.   Orders:  •  CBC and differential; Future  •  Comprehensive metabolic panel; Future  •  Lipid Panel with Direct LDL reflex; Future  •  TSH, 3rd generation with Free T4 reflex; Future

## 2025-02-26 NOTE — ASSESSMENT & PLAN NOTE
Component      Latest Ref Rng 8/10/2024 2/22/2025   Cholesterol      See Comment mg/dL 155  176    Triglycerides      See Comment mg/dL 111  105    HDL      >=40 mg/dL 22 (L)  27 (L)    LDL Calculated      0 - 100 mg/dL 111 (H)  128 (H)       - Mildly elevated LDL. HDL has improved.  - Can continue fish oil.  - Encourage low fat diet and regular exercise.  - Will continue to monitor fasting lipid panel.   Orders:  •  Lipid Panel with Direct LDL reflex; Future

## 2025-02-26 NOTE — PROGRESS NOTES
Name: Ian Sharma      : 1975      MRN: [id number]  Encounter Provider: KAMALA Palma  Encounter Date: 2025   Encounter department: [facility]    Assessment & Plan  Hypothyroidism, unspecified type  - TSH therapeutic.  - Continue levothyroxine 75 mcg daily.   - Will continue to monitor TSH and free T4.   Orders:  •  TSH, 3rd generation with Free T4 reflex; Future    Hyperlipidemia, unspecified hyperlipidemia type  Component      Latest Ref Rng 8/10/2024 2025   Cholesterol      See Comment mg/dL 155  176    Triglycerides      See Comment mg/dL 111  105    HDL      >=40 mg/dL 22 (L)  27 (L)    LDL Calculated      0 - 100 mg/dL 111 (H)  128 (H)       - Mildly elevated LDL. HDL has improved.  - Can continue fish oil.  - Encourage low fat diet and regular exercise.  - Will continue to monitor fasting lipid panel.   Orders:  •  Lipid Panel with Direct LDL reflex; Future    Elevated blood pressure reading in office without diagnosis of hypertension  - Blood pressure improved and now well controlled.  - Will continue to monitor.   Orders:  •  CBC and differential; Future  •  Comprehensive metabolic panel; Future  •  Lipid Panel with Direct LDL reflex; Future  •  TSH, 3rd generation with Free T4 reflex; Future    Metabolic dysfunction-associated steatotic liver disease (MASLD)  - LFTs greatly improved.   - Encourage low fat diet and regular exercise.  - Continue routine follow up with GI.        Hypercalcemia  - Will check PTH.  - Consider referral to Endocrinology if elevated.  Orders:  •  PTH, intact; Future  •  Comprehensive metabolic panel; Future    Vitamin D deficiency  - Prescription sent for Vitamin D 50,000 units weekly for 12 weeks. Can then take OTC supplement of 2000 units/day.  - Will continue to monitor Vitamin D level.   Orders:  •  ergocalciferol (VITAMIN D2) 50,000 units; Take 1 capsule (50,000 Units total) by mouth once a week for 12 doses  •   Vitamin D 25 hydroxy; Future    Primary osteoarthritis of one hip, left    Orders:  •  meloxicam (MOBIC) 15 mg tablet; Take 1 tablet (15 mg total) by mouth daily    Screening for prostate cancer    Orders:  •  PSA, Total Screen; Future         History of Present Illness     Patient with PMH of HDL, hypothyroidism, and MASLD presents to office today for routine follow up. He is taking his prescribed medications and reports no side effects. He had his blood work done which showed slightly elevated cholesterol. He has been taking fish oil supplement. His liver enzymes have greatly improved. He has made changes to his diet and lost some weight. He is following with a liver specialist. His blood pressure was elevated last visit but this has also improved. He has been monitoring his blood pressure at home and reports readings of 120-130s systolic and 80s diastolic. He denies any significant concerns or complaints today.           Review of Systems   Constitutional:  Negative for fatigue and fever.   HENT:  Negative for trouble swallowing.    Eyes:  Negative for visual disturbance.   Respiratory:  Negative for cough and shortness of breath.    Cardiovascular:  Negative for chest pain and palpitations.   Gastrointestinal:  Negative for abdominal pain and blood in stool.   Endocrine: Negative for cold intolerance and heat intolerance.   Genitourinary:  Negative for difficulty urinating and dysuria.   Musculoskeletal:  Negative for gait problem.   Skin:  Negative for rash.   Neurological:  Negative for dizziness, syncope and headaches.   Hematological:  Negative for adenopathy.   Psychiatric/Behavioral:  Negative for behavioral problems.      Past Medical History:   Diagnosis Date   • Disease of thyroid gland      Past Surgical History:   Procedure Laterality Date   • TONSILLECTOMY       Family History   Problem Relation Age of Onset   • Cancer Father      Social History     Tobacco Use   • Smoking status: Never   • Smokeless  "tobacco: Never   Vaping Use   • Vaping status: Never Used   Substance and Sexual Activity   • Alcohol use: Not Currently   • Drug use: Never   • Sexual activity: Yes     Partners: Female     Current Outpatient Medications on File Prior to Visit   Medication Sig   • levothyroxine 75 mcg tablet TAKE 1 TABLET BY MOUTH EVERY DAY   • sildenafil (VIAGRA) 100 mg tablet Take 1 tablet (100 mg total) by mouth daily as needed for erectile dysfunction   • [DISCONTINUED] meloxicam (MOBIC) 15 mg tablet TAKE 1 TABLET (15 MG TOTAL) BY MOUTH DAILY.   • [DISCONTINUED] meloxicam (Mobic) 15 mg tablet Take 1 tablet (15 mg total) by mouth daily (Patient not taking: Reported on 8/8/2024)     No Known Allergies  Immunization History   Administered Date(s) Administered   • COVID-19 PFIZER VACCINE 0.3 ML IM 09/24/2021, 10/15/2021     Objective   /84 (BP Location: Left arm, Patient Position: Sitting, Cuff Size: Large)   Pulse 67   Temp 97.8 °F (36.6 °C) (Tympanic)   Resp 16   Ht 6' 1\" (1.854 m)   Wt (!) 146 kg (322 lb 6.4 oz)   SpO2 98%   BMI 42.54 kg/m²     Physical Exam  Vitals and nursing note reviewed.   Constitutional:       Appearance: Normal appearance.   HENT:      Head: Normocephalic and atraumatic.      Right Ear: External ear normal.      Left Ear: External ear normal.   Eyes:      Conjunctiva/sclera: Conjunctivae normal.   Cardiovascular:      Rate and Rhythm: Normal rate and regular rhythm.      Heart sounds: Normal heart sounds.   Pulmonary:      Effort: Pulmonary effort is normal.      Breath sounds: Normal breath sounds.   Musculoskeletal:         General: Normal range of motion.      Cervical back: Normal range of motion.   Skin:     General: Skin is warm and dry.   Neurological:      Mental Status: He is alert and oriented to person, place, and time.   Psychiatric:         Mood and Affect: Mood normal.         Behavior: Behavior normal.         "

## 2025-02-26 NOTE — ASSESSMENT & PLAN NOTE
- TSH therapeutic.  - Continue levothyroxine 75 mcg daily.   - Will continue to monitor TSH and free T4.   Orders:  •  TSH, 3rd generation with Free T4 reflex; Future

## 2025-02-27 LAB
A1AT PHENOTYP SERPL IFE: NORMAL
A1AT SERPL-MCNC: 125 MG/DL (ref 101–187)

## 2025-03-28 PROBLEM — Z12.5 SCREENING FOR PROSTATE CANCER: Status: RESOLVED | Noted: 2025-02-26 | Resolved: 2025-03-28

## 2025-04-07 ENCOUNTER — OFFICE VISIT (OUTPATIENT)
Dept: GASTROENTEROLOGY | Facility: CLINIC | Age: 50
End: 2025-04-07
Payer: COMMERCIAL

## 2025-04-07 VITALS
OXYGEN SATURATION: 94 % | SYSTOLIC BLOOD PRESSURE: 138 MMHG | DIASTOLIC BLOOD PRESSURE: 82 MMHG | BODY MASS INDEX: 41.75 KG/M2 | WEIGHT: 315 LBS | HEART RATE: 76 BPM | TEMPERATURE: 96.9 F | HEIGHT: 73 IN

## 2025-04-07 DIAGNOSIS — K76.0 METABOLIC DYSFUNCTION-ASSOCIATED STEATOTIC LIVER DISEASE (MASLD): Primary | ICD-10-CM

## 2025-04-07 PROCEDURE — 99214 OFFICE O/P EST MOD 30 MIN: CPT | Performed by: INTERNAL MEDICINE

## 2025-04-07 NOTE — PROGRESS NOTES
Name: Ian Sharma      : 1975      MRN: [id number]  Encounter Provider: Tan Schultz MD  Encounter Date: 2025   Encounter department: [facility] [place]  :  Assessment & Plan  Metabolic dysfunction-associated steatotic liver disease (MASLD)  Patient is presenting for a follow-up visit.  Patient was seen in hepatology office in October of last year.  He underwent an elastography study in February which showed F0 to F1 fibrosis however his ELF score was 11.17 making him close to high risk.  Due to this discrepancy patient was advised to come back to the office for close follow-up.  He endorses anywhere between a 20 to 30 pound weight loss.  Patient otherwise is feeling okay, he endorses social alcohol use a few times in a year.  Patient advised to continue with ongoing weight loss with repeat ELF in 6 months and an elastography study in 1 year.  Discussed with the patient if patient having difficulty with weight loss can consider GLP-1 medications to help with weight loss.    Fibrosis-4 (FIB-4) Score is: 1.26    Indication for testing Absence of advanced fibrosis Presence of advanced fibrosis Indeterminate result   NAFLD <1.30 >2.67 1.30-2.67   Hepatitis C <1.45 >3.25 1.45-3.25   Hepatitis B <1.00 >2.65 1.00-2.65     FIB-4 Score Component Values:  Component Value Date   Age: 49 y.o.     AST: 27 U/L 2025   Platelet: 152 Thousands/uL 2025   ALT: 50 U/L 2025         - US elastography/UGAP 2026  - Enhanced Liver Fibrosis (ELF) Score in 6 months  -Continue with ongoing weight loss, encouraged to discuss GLP-1 medication if having difficulty maintaining weight loss  -Continue with alcohol cessation  -Follow-up in 6 months          History of Present Illness   Ian Sharma is a 49 y.o. male who presents for a follow-up visit.  Past medical history is notable for hepatic steatosis, hypothyroidism, hyperlipidemia.  Patient was last seen in  "hepatology office in October 2020 for at that time with concerns of possible hepatic steatosis with abnormal liver enzymes.  Patient underwent an elastography study in February which showed F0-F1 fibrosis however his ELF study was elevated.  Patient states since then he has been trying to lose weight and endorses about a 20 to 30 pound weight loss.  He denies any upper GI symptoms at this time he also endorses very social alcohol use.    Patient underwent a Cologuard study in 2024 which was negative, patient has not had a colonoscopy thus far.  He denies any family history of liver disease.      HPI  Review of Systems A complete review of systems is negative other than that noted above in the HPI.      Current Outpatient Medications   Medication Sig Dispense Refill    ergocalciferol (VITAMIN D2) 50,000 units Take 1 capsule (50,000 Units total) by mouth once a week for 12 doses 12 capsule 0    levothyroxine 75 mcg tablet TAKE 1 TABLET BY MOUTH EVERY DAY 90 tablet 0    meloxicam (MOBIC) 15 mg tablet Take 1 tablet (15 mg total) by mouth daily 90 tablet 0    Omega-3 Fatty Acids (FISH OIL EXTRA STRENGTH PO) Take 4 tablets by mouth in the morning      sildenafil (VIAGRA) 100 mg tablet Take 1 tablet (100 mg total) by mouth daily as needed for erectile dysfunction 30 tablet 1     No current facility-administered medications for this visit.     Objective   /82 (BP Location: Left arm, Patient Position: Sitting, Cuff Size: Standard)   Pulse 76   Temp (!) 96.9 °F (36.1 °C) (Tympanic)   Ht 6' 1\" (1.854 m)   Wt (!) 149 kg (327 lb 6.4 oz)   SpO2 94%   BMI 43.20 kg/m²     Physical Exam  Pulmonary:      Effort: Pulmonary effort is normal.   Abdominal:      General: Abdomen is flat.   Skin:     General: Skin is dry.   Neurological:      Mental Status: He is alert.   Psychiatric:         Mood and Affect: Mood normal.            Lab Results: I personally reviewed relevant lab results.             "

## 2025-05-23 DIAGNOSIS — E03.9 HYPOTHYROIDISM, UNSPECIFIED TYPE: ICD-10-CM

## 2025-05-23 RX ORDER — LEVOTHYROXINE SODIUM 75 UG/1
75 TABLET ORAL DAILY
Qty: 90 TABLET | Refills: 1 | Status: SHIPPED | OUTPATIENT
Start: 2025-05-23

## 2025-07-17 ENCOUNTER — TELEMEDICINE (OUTPATIENT)
Dept: FAMILY MEDICINE CLINIC | Facility: CLINIC | Age: 50
End: 2025-07-17
Payer: COMMERCIAL

## 2025-07-17 DIAGNOSIS — R50.9 FEVER, UNSPECIFIED FEVER CAUSE: Primary | ICD-10-CM

## 2025-07-17 PROCEDURE — 99213 OFFICE O/P EST LOW 20 MIN: CPT | Performed by: NURSE PRACTITIONER

## 2025-07-17 NOTE — PROGRESS NOTES
Virtual Regular Visit  Name: Ian Sharma      : 1975      MRN: [id number]  Encounter Provider: KAMALA Palma  Encounter Date: 2025   Encounter department: [facility]  :  Assessment & Plan  Fever, unspecified fever cause  - Continue alternating Tylenol and Motrin.  - Increase oral hydration.   - Contact office with new or worsening symptoms.            History of Present Illness     Patient presents today for virtual visit with complaints of fever and headache. Fever started on Tuesday. Temperature was 102.8. Also reported chills. Denies any cough, congestion, or sore throat. He took a home COVID and flu test today which was negative. His wife did have COVID recently. He has been alternating Tylenol and Motrin. He denies any other concerns or complaints today.      Review of Systems   Constitutional:  Positive for chills and fever. Negative for fatigue.   HENT:  Negative for congestion, sore throat and trouble swallowing.    Eyes:  Negative for visual disturbance.   Respiratory:  Negative for cough and shortness of breath.    Cardiovascular:  Negative for chest pain and palpitations.   Gastrointestinal:  Negative for abdominal pain and blood in stool.   Endocrine: Negative for cold intolerance and heat intolerance.   Genitourinary:  Negative for difficulty urinating and dysuria.   Musculoskeletal:  Negative for gait problem.   Skin:  Negative for rash.   Neurological:  Positive for headaches. Negative for dizziness and syncope.   Hematological:  Negative for adenopathy.   Psychiatric/Behavioral:  Negative for behavioral problems.        Objective   There were no vitals taken for this visit.    Physical Exam  Vitals and nursing note reviewed.   Constitutional:       General: He is not in acute distress.     Appearance: Normal appearance.   HENT:      Head: Normocephalic and atraumatic.      Right Ear: External ear normal.      Left Ear: External ear normal.      Eyes:      Conjunctiva/sclera: Conjunctivae normal.     Pulmonary:      Effort: Pulmonary effort is normal.     Neurological:      Mental Status: He is alert and oriented to person, place, and time.     Psychiatric:         Mood and Affect: Mood normal.         Behavior: Behavior normal.         Administrative Statements   Encounter provider KAMALA Palma    The Patient is located at Home and in the following state in which I hold an active license [place].    The patient was identified by name and date of birth. Ian Sharma was informed that this is a telemedicine visit and that the visit is being conducted through the Epic Embedded platform. He agrees to proceed..  My office door was closed. No one else was in the room.  He acknowledged consent and understanding of privacy and security of the video platform. The patient has agreed to participate and understands they can discontinue the visit at any time.    I have spent a total time of 15 minutes in caring for this patient on the day of the visit/encounter including Instructions for management, Impressions, Documenting in the medical record, Reviewing/placing orders in the medical record (including tests, medications, and/or procedures), and Obtaining or reviewing history  , not including the time spent for establishing the audio/video connection.

## 2025-07-17 NOTE — LETTER
July 17, 2025     Patient: Ian Sharma  YOB: 1975  Date of Visit: 7/17/2025      To Whom it May Concern:    Ian Sharma is under my professional care. Ian was seen in my office on 7/17/2025. Ian may return to work on 7/21/2025.    If you have any questions or concerns, please don't hesitate to call.         Sincerely,          KAMALA Palma        CC: No Recipients

## 2025-07-19 ENCOUNTER — OFFICE VISIT (OUTPATIENT)
Dept: URGENT CARE | Age: 50
End: 2025-07-19
Payer: COMMERCIAL

## 2025-07-19 VITALS
DIASTOLIC BLOOD PRESSURE: 80 MMHG | OXYGEN SATURATION: 96 % | HEART RATE: 80 BPM | RESPIRATION RATE: 20 BRPM | SYSTOLIC BLOOD PRESSURE: 141 MMHG | TEMPERATURE: 97.1 F

## 2025-07-19 DIAGNOSIS — J40 BRONCHITIS: Primary | ICD-10-CM

## 2025-07-19 PROCEDURE — G0382 LEV 3 HOSP TYPE B ED VISIT: HCPCS | Performed by: PHYSICIAN ASSISTANT

## 2025-07-19 PROCEDURE — S9083 URGENT CARE CENTER GLOBAL: HCPCS | Performed by: PHYSICIAN ASSISTANT

## 2025-07-19 RX ORDER — BENZONATATE 100 MG/1
100 CAPSULE ORAL 3 TIMES DAILY PRN
Qty: 20 CAPSULE | Refills: 0 | Status: SHIPPED | OUTPATIENT
Start: 2025-07-19

## 2025-07-19 RX ORDER — DOXYCYCLINE 100 MG/1
100 CAPSULE ORAL EVERY 12 HOURS SCHEDULED
Qty: 20 CAPSULE | Refills: 0 | Status: SHIPPED | OUTPATIENT
Start: 2025-07-19 | End: 2025-07-29

## 2025-07-19 NOTE — PROGRESS NOTES
[facility]        NAME: Ian Sharma is a 49 y.o. male  : 1975    MRN: [id number]  DATE: 2025  TIME: 12:14 PM    /80   Pulse 80   Temp (!) 97.1 °F (36.2 °C) (Tympanic)   Resp 20   SpO2 96%     Assessment and Plan   No primary diagnosis found.  No diagnosis found.      Patient Instructions       Follow up with PCP in 3-5 days.  Proceed to  ER if symptoms worsen.    Chief Complaint     Chief Complaint   Patient presents with    Fever    Cough     Started Monday with fevers as high as 102. Was taking tylenol. Continued through out the week and had a virtual visit on Thursday and was told a virus and continue to take tylenol and ibuprofen. Has a slight cough/tickle in his throat. Just wants to be seen incase anything else develops.          History of Present Illness       Pt with fevers this week, started with cough yesterday   feels fine now     Fever  Associated symptoms include coughing and a fever.   Cough  Associated symptoms include a fever.       Review of Systems   Review of Systems   Constitutional:  Positive for fever.   HENT: Negative.     Eyes: Negative.    Respiratory:  Positive for cough.    Cardiovascular: Negative.    Gastrointestinal: Negative.    Endocrine: Negative.    Genitourinary: Negative.    Musculoskeletal: Negative.    Skin: Negative.    Allergic/Immunologic: Negative.    Neurological: Negative.    Hematological: Negative.    Psychiatric/Behavioral: Negative.     All other systems reviewed and are negative.        Current Medications     Current Medications[1]    Current Allergies     Allergies as of 2025    (No Known Allergies)            The following portions of the patient's history were reviewed and updated as appropriate: allergies, current medications, past family history, past medical history, past social history, past surgical history and problem list.     Past Medical History[2]    Past Surgical History[3]    Family  History[4]      Medications have been verified.        Objective   /80   Pulse 80   Temp (!) 97.1 °F (36.2 °C) (Tympanic)   Resp 20   SpO2 96%        Physical Exam     Physical Exam  Vitals and nursing note reviewed.   Constitutional:       Appearance: Normal appearance. He is normal weight.   HENT:      Head: Normocephalic and atraumatic.      Right Ear: Tympanic membrane, ear canal and external ear normal.      Left Ear: Tympanic membrane, ear canal and external ear normal.      Nose: Nose normal.      Mouth/Throat:      Mouth: Mucous membranes are moist.      Pharynx: Oropharynx is clear.     Eyes:      Extraocular Movements: Extraocular movements intact.      Pupils: Pupils are equal, round, and reactive to light.       Cardiovascular:      Rate and Rhythm: Normal rate and regular rhythm.      Pulses: Normal pulses.      Heart sounds: Normal heart sounds.   Pulmonary:      Effort: Pulmonary effort is normal.      Comments: Minor coarse sounds right side cleared with cough   Abdominal:      Palpations: Abdomen is soft.     Musculoskeletal:      Cervical back: Normal range of motion and neck supple.     Skin:     General: Skin is warm.      Capillary Refill: Capillary refill takes less than 2 seconds.     Neurological:      Mental Status: He is alert and oriented to person, place, and time.                          [1]   Current Outpatient Medications:     levothyroxine 75 mcg tablet, TAKE 1 TABLET BY MOUTH EVERY DAY, Disp: 90 tablet, Rfl: 1    meloxicam (MOBIC) 15 mg tablet, Take 1 tablet (15 mg total) by mouth daily, Disp: 90 tablet, Rfl: 0    Omega-3 Fatty Acids (FISH OIL EXTRA STRENGTH PO), Take 4 tablets by mouth in the morning, Disp: , Rfl:     sildenafil (VIAGRA) 100 mg tablet, Take 1 tablet (100 mg total) by mouth daily as needed for erectile dysfunction, Disp: 30 tablet, Rfl: 1    ergocalciferol (VITAMIN D2) 50,000 units, Take 1 capsule (50,000 Units total) by mouth once a week for 12 doses,  Disp: 12 capsule, Rfl: 0  [2]   Past Medical History:  Diagnosis Date    Disease of thyroid gland    [3]   Past Surgical History:  Procedure Laterality Date    ARM REPLANTATION      TONSILLECTOMY     [4]   Family History  Problem Relation Name Age of Onset    Cancer Father Jose

## 2025-07-31 ENCOUNTER — TELEPHONE (OUTPATIENT)
Age: 50
End: 2025-07-31

## 2025-08-02 ENCOUNTER — OFFICE VISIT (OUTPATIENT)
Dept: URGENT CARE | Age: 50
End: 2025-08-02
Payer: COMMERCIAL

## 2025-08-02 ENCOUNTER — APPOINTMENT (OUTPATIENT)
Dept: RADIOLOGY | Age: 50
End: 2025-08-02
Payer: COMMERCIAL

## 2025-08-02 VITALS
DIASTOLIC BLOOD PRESSURE: 82 MMHG | SYSTOLIC BLOOD PRESSURE: 141 MMHG | TEMPERATURE: 97.4 F | OXYGEN SATURATION: 99 % | HEART RATE: 77 BPM | RESPIRATION RATE: 20 BRPM

## 2025-08-02 DIAGNOSIS — R05.1 ACUTE COUGH: Primary | ICD-10-CM

## 2025-08-02 DIAGNOSIS — R05.1 ACUTE COUGH: ICD-10-CM

## 2025-08-02 PROCEDURE — G0382 LEV 3 HOSP TYPE B ED VISIT: HCPCS | Performed by: PHYSICIAN ASSISTANT

## 2025-08-02 PROCEDURE — S9083 URGENT CARE CENTER GLOBAL: HCPCS | Performed by: PHYSICIAN ASSISTANT

## 2025-08-02 PROCEDURE — 71046 X-RAY EXAM CHEST 2 VIEWS: CPT

## 2025-08-02 RX ORDER — METHYLPREDNISOLONE 4 MG/1
TABLET ORAL
Qty: 1 EACH | Refills: 0 | Status: SHIPPED | OUTPATIENT
Start: 2025-08-02